# Patient Record
Sex: FEMALE | Race: WHITE | NOT HISPANIC OR LATINO | Employment: STUDENT | URBAN - METROPOLITAN AREA
[De-identification: names, ages, dates, MRNs, and addresses within clinical notes are randomized per-mention and may not be internally consistent; named-entity substitution may affect disease eponyms.]

---
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
## 2019-01-21 ENCOUNTER — OFFICE VISIT - HEALTHEAST (OUTPATIENT)
Dept: FAMILY MEDICINE | Facility: CLINIC | Age: 19
End: 2019-01-21

## 2019-01-21 DIAGNOSIS — R00.2 PALPITATIONS: ICD-10-CM

## 2019-01-21 DIAGNOSIS — R53.83 FATIGUE, UNSPECIFIED TYPE: ICD-10-CM

## 2019-01-21 LAB
ANION GAP SERPL CALCULATED.3IONS-SCNC: 10 MMOL/L (ref 5–18)
BUN SERPL-MCNC: 10 MG/DL (ref 8–22)
CALCIUM SERPL-MCNC: 9.4 MG/DL (ref 8.5–10.5)
CHLORIDE BLD-SCNC: 106 MMOL/L (ref 98–107)
CO2 SERPL-SCNC: 24 MMOL/L (ref 22–31)
CREAT SERPL-MCNC: 0.77 MG/DL (ref 0.6–1.1)
ERYTHROCYTE [DISTWIDTH] IN BLOOD BY AUTOMATED COUNT: 11.6 % (ref 11–14.5)
GFR SERPL CREATININE-BSD FRML MDRD: >60 ML/MIN/1.73M2
GLUCOSE BLD-MCNC: 82 MG/DL (ref 70–125)
HCT VFR BLD AUTO: 39.1 % (ref 35–47)
HGB BLD-MCNC: 12.9 G/DL (ref 12–16)
MCH RBC QN AUTO: 29.1 PG (ref 27–34)
MCHC RBC AUTO-ENTMCNC: 33.1 G/DL (ref 32–36)
MCV RBC AUTO: 88 FL (ref 80–100)
PLATELET # BLD AUTO: 155 THOU/UL (ref 140–440)
PMV BLD AUTO: 9.3 FL (ref 7–10)
POTASSIUM BLD-SCNC: 4 MMOL/L (ref 3.5–5)
RBC # BLD AUTO: 4.44 MILL/UL (ref 3.8–5.4)
SODIUM SERPL-SCNC: 140 MMOL/L (ref 136–145)
T3 SERPL-MCNC: 76 NG/DL (ref 45–175)
T4 FREE SERPL-MCNC: 1 NG/DL (ref 0.7–1.8)
TSH SERPL DL<=0.005 MIU/L-ACNC: 1.01 UIU/ML (ref 0.3–5)
WBC: 6.2 THOU/UL (ref 4–11)

## 2019-05-23 ENCOUNTER — OFFICE VISIT - HEALTHEAST (OUTPATIENT)
Dept: FAMILY MEDICINE | Facility: CLINIC | Age: 19
End: 2019-05-23

## 2019-05-23 DIAGNOSIS — F41.9 ANXIETY: ICD-10-CM

## 2019-05-23 DIAGNOSIS — Z11.1 SCREENING-PULMONARY TB: ICD-10-CM

## 2019-05-23 DIAGNOSIS — Z00.00 ROUTINE GENERAL MEDICAL EXAMINATION AT A HEALTH CARE FACILITY: ICD-10-CM

## 2019-05-23 RX ORDER — HYDROXYZINE HYDROCHLORIDE 10 MG/1
10 TABLET, FILM COATED ORAL 3 TIMES DAILY PRN
Qty: 30 TABLET | Refills: 0 | Status: SHIPPED | OUTPATIENT
Start: 2019-05-23 | End: 2021-08-30

## 2019-05-23 ASSESSMENT — MIFFLIN-ST. JEOR: SCORE: 1564.41

## 2019-05-26 LAB
GAMMA INTERFERON BACKGROUND BLD IA-ACNC: 0.04 IU/ML
M TB IFN-G BLD-IMP: NEGATIVE
MITOGEN IGNF BCKGRD COR BLD-ACNC: -0.02 IU/ML
MITOGEN IGNF BCKGRD COR BLD-ACNC: 0.01 IU/ML
QTF INTERPRETATION: NORMAL
QTF MITOGEN - NIL: 8 IU/ML

## 2019-11-14 ENCOUNTER — COMMUNICATION - HEALTHEAST (OUTPATIENT)
Dept: SCHEDULING | Facility: CLINIC | Age: 19
End: 2019-11-14

## 2020-10-15 ENCOUNTER — OFFICE VISIT - RIVER FALLS (OUTPATIENT)
Dept: FAMILY MEDICINE | Facility: CLINIC | Age: 20
End: 2020-10-15

## 2020-10-15 ENCOUNTER — AMBULATORY - RIVER FALLS (OUTPATIENT)
Dept: FAMILY MEDICINE | Facility: CLINIC | Age: 20
End: 2020-10-15

## 2020-10-18 LAB — SARS-COV-2 RNA SPEC QL NAA+PROBE: NOT DETECTED

## 2021-05-04 ENCOUNTER — AMBULATORY - RIVER FALLS (OUTPATIENT)
Dept: FAMILY MEDICINE | Facility: CLINIC | Age: 21
End: 2021-05-04

## 2021-05-04 ENCOUNTER — OFFICE VISIT - RIVER FALLS (OUTPATIENT)
Dept: FAMILY MEDICINE | Facility: CLINIC | Age: 21
End: 2021-05-04

## 2021-05-05 LAB — SARS-COV-2 RNA RESP QL NAA+PROBE: NEGATIVE

## 2021-05-24 ENCOUNTER — RECORDS - HEALTHEAST (OUTPATIENT)
Dept: ADMINISTRATIVE | Facility: CLINIC | Age: 21
End: 2021-05-24

## 2021-05-29 NOTE — PROGRESS NOTES
Assessment and Plan:    1. Routine general medical examination at a health care facility  She is up-to-date on vaccinations.    2. Screening-pulmonary TB  Will notify patient of TB screening results.  Completed paperwork for CNA classes.  - QTF-Mycobacterium tuberculosis by [facility]    3. Anxiety  Discussed treatment options.  She declines counseling.  Will start hydroxyzine use as needed. Educated on its indications and side effects.  She is to avoid taking this with other sedatives.  She is to follow-up if symptoms persist or worsen.  - hydrOXYzine HCl (ATARAX) 10 MG tablet; Take 1 tablet (10 mg total) by mouth 3 (three) times a day as needed for anxiety.  Dispense: 30 tablet; Refill: 0      Subjective:     Priya is a 19 y.o. female presenting to the clinic for a female physical.     LMP: this week, regular once/month  Hx of abnormal pap smear: n/a  Last pap smear: n/a  Perform self-breast exams: occasionally   Vaginal discharge or irritation: none   Sexually active: single, not sexually active   Contraception: none   Concerns for STDs: none   Previous pregnancies:none     She is starting classes to become a CNA.  She requests TB screening.  She denies chronic cough or unintentional weight loss.  She denies history of positive TB screening in the past.    Patient is concerned of anxiety since middle school.  She has had difficulty coping in social situations.  When she has to meet new people, she develops chest pain, tremors, sweaty palms, tachycardia.  Symptoms occurred during an interview last week.  She becomes concerned of how she looks and presents herself.  She is interested in medication for this today.    Review of systems:  I performed a 10 point review of systems.  All pertinent positives and negatives are noted in the HPI. All others are negative.     No Known Allergies    Current Outpatient Medications on File Prior to Visit   Medication Sig Dispense Refill     ferrous sulfate 325 (07  FE) MG tablet Take 1 tablet by mouth daily with breakfast.       No current facility-administered medications on file prior to visit.        Social History     Socioeconomic History     Marital status: Single     Spouse name: Not on file     Number of children: Not on file     Years of education: Not on file     Highest education level: Not on file   Occupational History     Not on file   Social Needs     Financial resource strain: Not on file     Food insecurity:     Worry: Not on file     Inability: Not on file     Transportation needs:     Medical: Not on file     Non-medical: Not on file   Tobacco Use     Smoking status: Never Smoker     Smokeless tobacco: Never Used   Substance and Sexual Activity     Alcohol use: No     Drug use: No     Sexual activity: Never   Lifestyle     Physical activity:     Days per week: Not on file     Minutes per session: Not on file     Stress: Not on file   Relationships     Social connections:     Talks on phone: Not on file     Gets together: Not on file     Attends [employer] service: Not on file     Active member of club or organization: Not on file     Attends meetings of clubs or organizations: Not on file     Relationship status: Not on file     Intimate partner violence:     Fear of current or ex partner: Not on file     Emotionally abused: Not on file     Physically abused: Not on file     Forced sexual activity: Not on file   Other Topics Concern     Not on file   Social History Narrative    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  Doing well in school.  She also has a Mac[employer] minor and plans to study abroad in [place] for Spring 2019 semester.     Non-smoker, no alcohol use.  Not sexually active.        Joyce Lopez MD       No past medical history on file.    Family History   Problem Relation Age of Onset     Thyroid disease Mother         hyper--> Iodine--> now hypo     Fibroids Mother         caused anemia     Anemia Father        No  past surgical history on file.    Objective:     Vitals:    05/23/19 0856   BP: 108/60   Pulse: 73   SpO2: 99%       Patient is alert, no obvious distress.   Skin: Warm, dry.  No rashes or lesions. Skin turgor rapid return.   HEENT:  Eyes normal.  Ears normal.  Nose patent, mucosa pink.  Oropharynx mucosa pink, no lesions or tonsil enlargement.   Neck:  Supple, without lymphadenopathy, bruits, JVD. Thyroid normal texture and size.    Lungs:  Clear to auscultation.  No wheezing, rales noted.  Respirations even and unlabored.   Heart:  Regular rate and rhythm.  No murmurs.   Breasts:  Deferred per patient  Abdomen: Soft, nontender.  No organomegaly.  Bowel sounds normoactive.  No guarding or masses noted.   : deferred  Musculoskeletal:  Full ROM of extremities.  Muscle strength equal +5/5.   Neurological:  Cranial nerves 2-12 intact.

## 2021-05-30 VITALS — HEIGHT: 70 IN | BODY MASS INDEX: 20.92 KG/M2 | WEIGHT: 146.1 LBS

## 2021-05-30 VITALS — BODY MASS INDEX: 19.55 KG/M2 | WEIGHT: 140.19 LBS

## 2021-05-30 VITALS — BODY MASS INDEX: 20.62 KG/M2 | WEIGHT: 144 LBS | HEIGHT: 70 IN

## 2021-05-31 VITALS — BODY MASS INDEX: 20.92 KG/M2 | WEIGHT: 150 LBS

## 2021-05-31 VITALS — BODY MASS INDEX: 21.09 KG/M2 | WEIGHT: 147.3 LBS | HEIGHT: 70 IN

## 2021-06-01 VITALS — BODY MASS INDEX: 21.15 KG/M2 | WEIGHT: 147.7 LBS | HEIGHT: 70 IN

## 2021-06-01 VITALS — BODY MASS INDEX: 20.5 KG/M2 | WEIGHT: 148 LBS

## 2021-06-01 VITALS — BODY MASS INDEX: 21.08 KG/M2 | WEIGHT: 152.2 LBS

## 2021-06-02 VITALS — HEIGHT: 72 IN | WEIGHT: 154.4 LBS | BODY MASS INDEX: 20.91 KG/M2

## 2021-06-02 VITALS — WEIGHT: 152.6 LBS | BODY MASS INDEX: 21.13 KG/M2

## 2021-06-03 NOTE — TELEPHONE ENCOUNTER
"Pt has vomited 12x over the past two hours.  Also reports:  - Sore throat.  - Runny nose.  - \"Chills but no fever.\"  - Body aches.    \"Last emesis included 'a few blood specks'.\"  All emesis episodes \"look bright-yellow-to-green.\"  Pt denies typical ny-brown stomach-acid color to emesis.  Some continuous abdominal pain \"centrally located.\"    Advised ED eval.  Pt agrees -> will have someone accompany her.    Bertha Sutton RN BSBA  [facility] RN Triage     Reason for Disposition    Bile (green color) in the vomit (Exception: stomach juice which is yellow)    Blood (red or coffee-ground color) in the vomit that's not from a nosebleed    Protocols used: VOMITING WITHOUT DIARRHEA-P-OH      "

## 2021-06-08 NOTE — PROGRESS NOTES
Assessment  1. Acute sinusitis     2. Iron deficiency anemia  HM2(CBC w/o Differential)   3. Neoplasm of uncertain behavior of skin         Plan    1.  Acute sinusitis: Will treat with Augmentin twice daily for 10 days.  Counseled on use of medication and side effects.  Also suggested fluticasone nasal spray.  May continue ibuprofen, cough drops and DayQuil as needed for symptom relief.  Encouraged rest and fluids.  Follow-up if symptoms worsening or not improving with these measures.  2.  Iron deficiency anemia: Recheck hemogram today.  Continue increased dietary intake of iron.  3.  Neoplasm of uncertain behavior: Advised that they schedule follow-up appointment with dermatologist to have these moles checked.  4.  Health maintenance: Menactra #2 administered today.  They decline HPV vaccine.  They also declined influenza vaccine.      Subjective  Priya Mann is a 16 y.o. female who comes into clinic accompanied by her mother due to concerns about ongoing cough.  She had recently traveled to [place].  Developed a cough about 2 weeks ago.  Cough does not seem to be resolving.  Mother has similar cough and recently saw primary care provider and was placed on antibiotics.  Patient complains of slight sore throat.  She has had some rhinorrhea as well as some wheezing.  Feels pressure in her ears.  Cough is productive of some phlegm.  She has had yellow drainage from the nose.  Has not had fevers or chills.  Has had some sinus pain and pressure.  Had a headache yesterday.  States that she cannot breathe through her nose.  She has been taking NyQuil and DayQuil along with ibuprofen and cough drops.  They also have concern about a couple of moles.  She noticed a mole near her right shoulder blade while in [place].  States that this has some irregular borders and irregular coloration to it.  She has history of atypical moles and had been removed in the past and she was told to watch for those things in association with  most.  She also had a mole removed on her left thigh several months ago.  This has returned and they would like me to take a look at it as well.  We reviewed medications and allergies.  They would also like to recheck her hemoglobin today.  She was found to have iron deficiency anemia by her primary care provider several months ago.  She has been taking an iron supplement.  Review of systems otherwise negative.  No other concerns or questions today.    Current Outpatient Prescriptions   Medication Sig Dispense Refill     amoxicillin-clavulanate (AUGMENTIN) 875-125 mg per tablet Take 1 tablet by mouth 2 (two) times a day for 7 days. 14 tablet 0     benzonatate (TESSALON PERLES) 100 MG capsule Take 1 capsule (100 mg total) by mouth 3 (three) times a day as needed for cough. 30 capsule 0     ferrous sulfate 325 (65 FE) MG tablet Take 1 tablet (325 mg total) by mouth daily. 30 tablet 3     SIMETHICONE (GAS-X ORAL) Take by mouth.       No current facility-administered medications for this visit.          Objective   Visit Vitals     /64 (Patient Site: Left Arm, Patient Position: Sitting, Cuff Size: Adult Regular)     Pulse 67     Temp 98  F (36.7  C) (Tympanic)     Wt 140 lb 3 oz (63.6 kg)     LMP 01/16/2017 (Exact Date)     SpO2 98%     Breastfeeding No         Gen: alert, no acute distress  HEENT;  NCAT, TMs normal, nose clear, OP clear  Neck: supple, no lymphadenopathy, thyroid normal  CV: RRR, no murmur  Resp: CTAB, no wheeze/crackles  Ext: warm, dry, no edema  Skin: Oval-shaped hyperpigmented lesion present right scapula with color variegation and slightly irregular borders.  Small hyper pigmented nevus present medial left thigh

## 2021-06-09 NOTE — PROGRESS NOTES
Assessment:     Priya was seen today for mass and labs only.    Diagnoses and all orders for this visit:    Anemia  -     HM1(CBC and Differential)  -     HM1 (CBC with Diff)    Posterior cervical lymphadenopathy  -     HM1(CBC and Differential)  -     HM1 (CBC with Diff)          Plan:     1. Anemia  Advise she continue to take iron.  Will do a CBC with differential today.  Give her option of oral birth control pills to help could not control her menses/menorrhagia  She declines OBC use    - HM1(CBC and Differential)  - HM1 (CBC with Diff)    2. Posterior cervical lymphadenopathy  Posterior cervical lymph node is mildly enlarged and likely irritated by her braces that she is wearing a small sebaceous cysts in the back of her tonsillar crypt.  Reassurance given  - HM1(CBC and Differential)  - HM1 (CBC with Diff)        Subjective:      Monie is a 16 y.o. female presenting to my clinic for evaluation of anemia and evaluation of mild anterior lymphadenopathy in the back of her left neck.  She has had that left neck lymph node that is been mildly enlarged for about a year.  It is mobile has not grown.  She has had braces placed within this year and does get tonsillar crypt inflammatory changes from time to time.  She has had anemia over the past several years also.  When she stopped taking her regular daily iron she started getting weak and dizzy again.  So now she is taking iron and her hemoglobin comes back greater than 12.  Patient is 16 and is having heavy periods.  She has about 5 days of bleeding with 3 days of heavy.  I think this is the reason for her low hemoglobin i.e. menorrhagia.    It is discussed with her that treatment for menorrhagia can be oral birth control pills just so she is given that option.  She is not inclined to want that today.  Patient is here with her mother      Current Outpatient Prescriptions on File Prior to Visit   Medication Sig Dispense Refill     ferrous sulfate 325 (65 FE)  "MG tablet Take 1 tablet (325 mg total) by mouth daily. 30 tablet 3     SIMETHICONE (GAS-X ORAL) Take by mouth.       benzonatate (TESSALON PERLES) 100 MG capsule Take 1 capsule (100 mg total) by mouth 3 (three) times a day as needed for cough. 30 capsule 0     No current facility-administered medications on file prior to visit.      No Known Allergies  History reviewed. No pertinent past medical history.  History reviewed. No pertinent surgical history.  Social History     Social History     Marital status: Single     Spouse name: N/A     Number of children: N/A     Years of education: N/A     Occupational History     Not on file.     Social History Main Topics     Smoking status: Never Smoker     Smokeless tobacco: Never Used     Alcohol use No     Drug use: No     Sexual activity: Not on file     Other Topics Concern     Not on file     Social History Narrative     History reviewed. No pertinent family history.    ROS:  I have performed a 10 point ROS.  All pertinent positives and negatives are found in the HPI.  All others are negative.      Objective:     Physical Exam:  /64 (Patient Site: Right Arm, Patient Position: Sitting, Cuff Size: Adult Regular)  Pulse 64  Temp 98.6  F (37  C) (Oral)   Ht 5' 11\" (1.803 m)  Wt 146 lb 1.6 oz (66.3 kg)  BMI 20.38 kg/m2  General Appearance: Alert, cooperative, no distress, appears stated age    Head: Normocephalic, without obvious abnormality, atraumatic/  Mild facial acne on forehead.  Eyes: PERRL, conjunctiva/corneas clear, EOM's intact  Ears: Normal TM's and external ear canals, both ears  Nose: Nares normal, septum midline,mucosa normal, no drainage  Throat: Lips, mucosa, and tongue normal; teeth and gums normal/posterior tonsillar crypt with sebaceous material and mild irritation  The braces are noted with some gum inflammation on her right upper teeth  Neck: Supple, symmetrical, trachea midline, mild posterior cervical lymph node palpated on the right;  " thyroid: not enlarged, symmetric, no tenderness/mass/nodules; no carotid bruit or JVD  Lungs: Clear to auscultation bilaterally, respirations unlabored  Heart: Regular rate and rhythm, S1 and S2 normal, no murmur, rub, or gallop,     Skin: Skin color, texture, turgor normal, no rashes or lesions  Lymph nodes: Posterior cervical palpable lymph node on the right.  Mobile, not fixated, 0.5 x 1 cm  Neurologic: Normal   Mental status: Stable    Labs: CBC report reviewed today with a hemoglobin above 12.  Minor deviation in neutrophils and lymphocytes within normal limits.  Letter sent to patient

## 2021-06-10 NOTE — PROGRESS NOTES
Subjective:      Priya Mann is a 17 y.o. female here with mom for evaluation of sore throat x 1 day. Some pain with swallowing, not significant change with appetite or fluid intake, no N/V/D, no stomach ache. Does have accompanying runny nose, congestion, denies any cough. Had a fever of 100.8 (oral) yesterday, this morning was 99.5, took some Ibuprofen, seemed to help, patient afebrile in clinic. Some generalized body aches and feeling more fatigued. Mom diagnosed with strep last Monday. She has finals coming up this next week, mom has asked that she be tested for influenza.    Objective:     Vitals:    05/21/17 1213   BP: 120/80   Pulse: 107   Resp: 18   Temp: 98.9  F (37.2  C)   SpO2: 98%       General: Alert, interactive, NAD, cooperative on exam  Eyes: PERRLA, EOMI, conjunctivae clear.   Ears: Right TM; pink and translucent. Left TM; pink and translucent   Nose:  Nasal mucosa erythema and mild inflammation> Clear mucus. No maxillary or frontal sinus tenderness  Mouth/Throat:  Tonsillar hypertrophy, 1+, erythematous, no exudate. Uvula midline. Posterior pharynx erythematous with some postnasal drainage.  Mucus membranes pink and moist, free of lesions.  Neck: Supple, symmetrical, trachea midline, no adenopathy   Lungs: CTA bilaterally, good air movement throughout. No rales, rhonchi or wheezing  Heart:: Regular rate and rhythm, S1, S2 normal, no murmur, click, rub or gallop      Results for orders placed or performed in visit on 05/21/17   Rapid Strep A Screen-Throat   Result Value Ref Range    Rapid Strep A Antigen No Group A Strep detected, presumptive negative No Group A Strep detected, presumptive negative   Influenza A/B Rapid Test   Result Value Ref Range    Influenza  A, Rapid Antigen No Influenza A antigen detected No Influenza A antigen detected    Influenza B, Rapid Antigen No Influenza B antigen detected No Influenza B antigen detected            Assessment/Plan:      1. Viral URI    2. Fever  -  Influenza A/B Rapid Test    3. Sore throat  - Rapid Strep A Screen-Throat  - Group A Strep, RNA Direct Detection, Throat     I reviewed exam and lab findings with patient and mom. Will contact parents/patient in next 48 hours only if strep confirmatory test positive, would prescribe antibiotic at that time. Dicussed contagious precautions just in case. If strep negative, likely viral illness that will resolve spontaneously. Recommended supportive cares; nasal saline, elevating hob, humidified air. Advised plenty of fluids and rest. Tylenol or Ibuprofen prn for fever/discomfort. If symptoms not improved in next 5-7 days, f/u with PCP, sooner if worsening.      -Patient instructions given.

## 2021-06-14 NOTE — PROGRESS NOTES
Assessment:      Left foot pain     Plan:      1. Rest, ice, elevate  2. NSAIDs  3. Discussed signs of worsening symptoms and when to follow-up in 4 days if no symptom improvement.  4. Provided reassurance, no imaging needed at this time     Patient Instructions   You were seen today for left foot pain.     Symptom management:  - Try to rest the foot as tolerated  - Elevate the foot above the chest  - Ice/heat pads  - May use tylenol/ibuprofen for swelling and discomfort    Reasons to be re-evaluated:  - Swelling and pain worsen  - Fever of 100.4  - No symptom improvement in 4 days              Subjective:       Priya Mann is a 17 y.o. female here for evaluation of left foot pain. Patient caught her left foot in a door stop which caught her 3rd, 4th, and 5th and pulled laterally. Onset was 3 days ago. Noted redness and warmth later that night, but able to bear weight. Notices pain after long periods of walking or going up and down stairs. Denies swelling or bruising. Pain is rated 6/10 during movement of affected toes. Symptoms have been gradually improving. No history of previous foot injury. Denies foot weakness or numbness/tingling.    The following portions of the patient's history were reviewed and updated as appropriate: allergies, current medications and problem list.    Review of Systems  Pertinent items are noted in HPI.     Allergies  No Known Allergies     Objective:      /66 (Patient Site: Right Arm, Patient Position: Sitting, Cuff Size: Adult Regular)  Pulse 78  Temp 98.3  F (36.8  C) (Oral)   Resp 16  Wt 150 lb (68 kg)  SpO2 99%  Breastfeeding? No  BMI 20.92 kg/m2  General appearance: alert, appears stated age, cooperative and no distress  Extremities: left foot: base of 4th toe mildly tender to palpation, FROM with mild pain on toe flexion; strength intact, sensation intact  Skin: no erythema, swelling, or ecchymosis

## 2021-06-15 NOTE — PROGRESS NOTES
Assessment and Plan:     1. Diarrhea, unspecified type  2.  Iron deficiency anemia  I suspect patient's diarrhea may be due to her new coffee intake.  Recommend discontinuing this to see how she feels over the next few weeks.  If symptoms persist, will refer to gastroenterology to rule out food intolerance and colitis.  Will rule out celiac and H. pylori.  Discussed increasing fluid and fiber in diet.  We discussed discontinuing her iron supplement.  We will consider a trial without the iron supplement for 2-3 months.  She will take a daily multivitamin.  She will follow-up for hemogram recheck.  She is content with the plan.   - Celiac(Gluten)Antibody Panel ($$$)  - H. pylori Antigen, Stool    Subjective:     Priya is a 17 y.o. female presenting to the clinic for concerns for diarrhea for 3 months.  Patient has a bowel movement once daily.  She denies any blood or mucus in the stool.  She occasionally has slight cramping prior to the bowel movement.  Patient states she started drinking coffee when she went to college.  It does correlate with when she developed diarrhea.  The diarrhea typically occur shortly after consuming coffee.  She denies any recent travel.  No fever has been present.  She denies any recent antibiotic use.  She stopped drinking coffee for the last few days and her symptoms have improved.  She has history of iron deficiency anemia.  She is taking an iron supplement daily.  Last hemoglobin was 12.6 on 4/3/17.  She denies having heavy menses.  She believes the iron deficiency was due to inadequate iron intake in the past.  She would like to know if she could stop her iron supplement.    Review of Systems: A complete 14 point review of systems was obtained and is negative or as stated in the history of present illness.    Social History     Social History     Marital status: Single     Spouse name: N/A     Number of children: N/A     Years of education: N/A     Occupational History     Not on file.  "    Social History Main Topics     Smoking status: Never Smoker     Smokeless tobacco: Never Used     Alcohol use No     Drug use: No     Sexual activity: Not on file     Other Topics Concern     Not on file     Social History Narrative       Active Ambulatory Problems     Diagnosis Date Noted     Upper Respiratory Infection      Benign Skin Neoplasm      Resolved Ambulatory Problems     Diagnosis Date Noted     Acute Suppurative Otitis Media      Acute Sore Throat      No Additional Past Medical History       No family history on file.    Objective:     /64  Pulse 70  Ht 5' 11.25\" (1.81 m)  Wt 147 lb 4.8 oz (66.8 kg)  BMI 20.4 kg/m2    Patient is alert, in no obvious distress.   Skin: Warm, dry.    Lungs:  Clear to auscultation. Respirations even and unlabored.  No wheezing or rales noted.   Heart:  Regular rate and rhythm.  No murmurs  Abdomen: Soft, nontender.  No organomegaly. Bowel sounds normoactive. No guarding or masses noted.               "

## 2021-06-17 NOTE — PROGRESS NOTES
[facility] Walk-[facility] Visit [place]    Subjective:    Priya Mann is a 18 y.o. female who presents for evaluation of upper respiratory symptoms x 4 days. She describes rhinorrhea, nasal congestion and sore throat. She has not had any cough. She feels a lot of drainage down the back of throat. She has also noticed occasional audible wheezing, but denies any shortness of breath and has no history of asthma. She says she has gotten this in the past with URI's. She has had no fevers or n/v/d. Her mom had similar symptoms recently. At home she has tried nyquil and cough drops, these have helped a little bit. She has no exposure to strep throat. She has not tried any nasal decongestants or nasal steroids. She has no chronic medical conditions and takes no daily meds. She has no allergies to any medications. She is going back to college tomorrow, so her mom just wanted to make sure she didn't have strep throat or another infection that would need an antibiotic.     ROS:   General: Denies fevers, chills.  Skin: Denies new rashes or lesions.  HEENT: +Sore throat, rhinorrhea.   CV: Denies chest pain, palpitations or shortness of breath.  GI: Denies N/V/D.    Objective:    /70  Pulse 91  Temp 98.1  F (36.7  C)  Resp 18  Wt 148 lb (67.1 kg)  SpO2 98%  BMI 20.5 kg/m2    Physical Exam:  General: NAD.  Skin: No rashes or lesions visualized.  HEENT: PERRLA, EOMI. TM's clear bilaterally. Posterior pharynx with only mild erythema. No significant nasal drainage.   Heart: Regular rhythm, no murmurs.  Lungs: Clear to auscultation b/l, no wheezes, crackles, or rales.    Assessment/Plan:    Pharyngitis: Rapid strep negative, will await culture results. I would be surprised if this is strep, so will not treat empirically. I suspect that this is viral or from post-nasal drip. Will treat with flonase and asked that she try tylenol or ibuprofen as needed. If symptoms are not improving in 3-5 days, I have asked that she follow up with  primary care physician.    Wheezing: Clear on exam, sating well. Not sure what to make of this report, but if anything this is probably just some mild hyperreactivity from a viral URI. If experiencing any SOB, will have her return or evaluation.     Jessika Mcdonald, DO

## 2021-06-19 ENCOUNTER — HEALTH MAINTENANCE LETTER (OUTPATIENT)
Age: 21
End: 2021-06-19

## 2021-06-19 NOTE — PROGRESS NOTES
Subjective:      Patient ID: Priya Mann is a 18 y.o. female.    Chief Complaint:    HPI Priya Mann is a 18 y.o. female who presents today complaining of bilateral ear pain after ×3 weeks.  Patient states that she got her ears pierced last April and then 3 weeks ago she switched out her earrings to a pair of her mom's and she started having ear crusting and pain.  She is also had a rash behind the left ear for the past 3 days.  She has a mass on the right side of the neck that has been there for a year, it is unchanged and not painful to the touch.  She denies any fever.      Social History   Substance Use Topics     Smoking status: Never Smoker     Smokeless tobacco: Never Used     Alcohol use No       Review of Systems   Constitutional: Negative for fever.   Skin: Positive for rash.        Positive for earlobe pain and discharge around ear piercings   Hematological: Positive for adenopathy.       Objective:     BP 94/60 (Patient Site: Left Arm, Patient Position: Sitting, Cuff Size: Adult Regular)  Pulse 94  Temp 98.2  F (36.8  C) (Oral)   Resp 16  Wt 152 lb 3.2 oz (69 kg)  LMP 08/03/2018  SpO2 97%  BMI 21.08 kg/m2    Physical Exam   Constitutional: She appears well-developed and well-nourished. No distress.   HENT:   Head: Normocephalic and atraumatic.   Eyes: Conjunctivae are normal.   Neck:   There is one singular lymph node palpable on the right posterior chain.  It is very small approximately 8 mm in diameter.  It is mobile and nontender.  It is been unchanged.   Pulmonary/Chest: Effort normal. No respiratory distress.   Skin: She is not diaphoretic.   An erythematous bumpy rash present kind the left ear.  Is nontender to palpation and not warm to the touch.  Is consistent with a contact dermatitis.  There is a similar bumps surrounding the gym of the earrings that are present in both of her ears.  Surrounding the piercings is yellow crusting and discharge.  There is no significant erythema around  the piercings.   Psychiatric: She has a normal mood and affect. Her behavior is normal. Judgment and thought content normal.   Nursing note and vitals reviewed.      Clinical Decision Making:  Suspect localized piercing infection possibly secondary to contact dermatitis.  Patient is unsure what the earrings that she was wearing remain out of, but I suspect nickel as this is commonly used in jewelry.  I recommended removing all metal during treatment period.  She was treated with soap and Bactroban and gentamicin ointment.  Lymph node noted on left posterior chain is not abnormal in size or shape.  Patient was reassured that this is a normal physical exam finding.    Assessment:     Procedures    1. Complication of ear piercing  mupirocin (BACTROBAN) 2 % ointment    gentamicin (GARAMYCIN) 0.1 % ointment   2. Contact dermatitis           Patient Instructions   1. I suggest that piercings be removed during treatment of localized skin infection.   2. Begin washing with goal dial three times per day. After washing apply a 50:50 combination of Mupirocin and Gentamicin to the skin.   3. I suspect that the rash is a dermatitis either from the metal that the earring is made from or from the pus that is exuding from the ear. You can apply an over the counter Hydrocortisone to the affected area.  4.  This does not appear to be a deep tissue infection, so I do not thing oral antibiotic therapy is necessary.   5. Follow up if you are not having any improvement in 7-10 days. If you have resolution of your symptoms and your piercing has not closed up you may replace the earring with surgical steel.

## 2021-06-20 NOTE — PROGRESS NOTES
Assessment and Plan:    1. Routine general medical examination at a health care facility  Discussed consuming a healthy diet and exercising.  Discussed importance routine sunscreen.  She sees a dermatologist once a year.  She declines Gardasil and influenza vaccines.  She states she is otherwise up-to-date.  She is not interested in birth control or STD screening today.  She will follow-up as needed.      Subjective:     Priya is a 18 y.o. female presenting to the clinic for a female physical.     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  She requires a physical form to be completed.     Review of systems:  I performed a 10 point review of systems.  All pertinent positives and negatives are noted in the HPI. All others are negative.     No Known Allergies    Current Outpatient Prescriptions on File Prior to Visit   Medication Sig Dispense Refill     fluticasone (FLONASE) 50 mcg/actuation nasal spray Two sprays in 2 each nostril daily 16 g 1     gentamicin (GARAMYCIN) 0.1 % ointment Apply topically 3 (three) times a day. 15 g 0     mupirocin (BACTROBAN) 2 % ointment Apply topically 3 (three) times a day. 22 g 0     No current facility-administered medications on file prior to visit.        Social History     Social History     Marital status: Single     Spouse name: N/A     Number of children: N/A     Years of education: N/A     Occupational History     Not on file.     Social History Main Topics     Smoking status: Never Smoker     Smokeless tobacco: Never Used     Alcohol use No     Drug use: No     Sexual activity: Not on file     Other Topics Concern     Not on file     Social History Narrative       No past medical  history on file.    No family history on file.    No past surgical history on file.    Objective:     Vitals:    08/30/18 1051   BP: 104/64   Pulse: 68       Patient is alert, no obvious distress.   Skin: Warm, dry.  No rashes or lesions. Skin turgor rapid return.   HEENT:  Eyes normal.  Ears normal.  Nose patent, mucosa pink.  Oropharynx mucosa pink, no lesions or tonsil enlargement.   Neck:  Supple, without lymphadenopathy, bruits, JVD. Thyroid normal texture and size.    Lungs:  Clear to auscultation.  No wheezing, rales noted.  Respirations even and unlabored.   Heart:  Regular rate and rhythm.  No murmurs.   Breasts:  Normal.  No surrounding adenopathy.   Abdomen: Soft, nontender.  No organomegaly.  Bowel sounds normoactive.  No guarding or masses noted.   :  deferred  Musculoskeletal:  Full ROM of extremities.  Muscle strength equal +5/5.   Neurological:  Cranial nerves 2-12 intact.

## 2021-06-23 NOTE — PROGRESS NOTES
Assessment/plan   Priya Mann is a 18 y.o. female who is new to my practice.    Priya was seen today for anemia and palpitations.    Diagnoses and all orders for this visit:    Fatigue, with h/o iron deficiency anemia.   Palpitations   She did have mild orthostatic hypotension noted today on exam. No cardiac history and palpitations are quite infrequent. Ddx includes PVCs or other arrythrymia though cardiac exam normal today.  No significant anxiety.  Agree with decision to recheck her blood counts today and additionally evaluate her thyroid.  Given she is clinically symptomatic with fatigue and intermittent palpitations would be reasonable to screen with TSH, including T4 and T3.  We will check electrolytes to evaluate further.   Declined EKG today which I feel is reasonable at this point given hx/exam.  We discussed eventual plans for EKG and or month-long Holter monitor given the infrequency of her symptoms should she desire further workup of the palpitations.    -     HM2(CBC w/o Differential)  -     Thyroid Stimulating Hormone (TSH)  -     T4, Free  -     T3, Total  -     Basic Metabolic Panel   -At this time she will continue once daily dosing of her iron, pending results may change dose.    She declined HPV vaccination, chlamydia testing, and influenza vaccine today.    Follow up: 1-2 weeks if fatigue is not improving and labs returned normal if she would like to discuss this further otherwise for annual physical      I spent 40 minutes with the patient, >50% of which was in counseling regarding the patient's medical issues as noted above.    Joyce Lopez MD    Subjective:      HPI: Priya Mann is a 18 y.o. female who is here for:    Chief Complaint   Patient presents with     Anemia     has been tired recently, x 3-4 weeks     Palpitations     going on for about a couple years, in the last 6 months has increased       Concern for anemia:  Was dx with anemia senior of high school- taking iron pills  since that time. If she mises a dose for a few days she feels more tired.   Past 3-4 weeks feeling drowsy, more tired than normal.  No specific change to her sleep pattern so she is wondering if maybe she is more anemic than previously.  Has been taking her iron supplement once daily.  Describes sometimes feeling lightheaded with changing from seated to standing position.     Both parents had anemia- mother with vaginal bleeding from uterine fibroids, father unsure.   Mother also h/o overactive thyroid s/p radioactive iodine, now on Synthroid.   Patient denies weight change, good appetite. No constipation or diarrhea. No hair, skin or nail changes.    Regular menses.  Blood flow is about 5 days, 2 of which with heavier flow changing pads every 2-3 hours.  She describes overall she feels her periods are within normal and do not feel heavy by any means.  Denies any abdominal cramping.    Palpitations: since senior year of highschool. HR seemed to slow down or have an irregular beat as she was falling asleep. Happens every 2- 3 months. No chest pain.    Was active with[employer] riding- no exertional sx with [employer] work outs.  Leaving for [place] for the semester soon.   Not using much caffeine at all.   No syncope, no family history of early cardiac disease.    Review of Systems:  She is not sexually active.  No concerns for STDs.  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  Doing well in school.  She also has a Croatian minor and plans to study abroad in [place] for Spring 2019 semester.     Non-smoker, no alcohol use.  Not sexually active.        Joyce Lopez MD       Medical History:  Patient Active Problem List   Diagnosis     Benign Skin Neoplasm     No past medical history on file.  No past surgical history on file.  Patient has no known  allergies.  Family History   Problem Relation Age of Onset     Thyroid disease Mother         hyper--> Iodine--> now hypo     Fibroids Mother         caused anemia     Anemia Father        Medications:  Current Outpatient Medications   Medication Sig Dispense Refill     ferrous sulfate 325 (65 FE) MG tablet Take 1 tablet by mouth daily with breakfast.       No current facility-administered medications for this visit.        Imaging & Labs reviewed this visit: Hemoglobin 10.8 October 2016 with MCV of 77, previous to that normal hemoglobin and MCV in 2009.  Most recent hemoglobin of 12.6 with normal MCV 4/3/2017.    Last TSH normal 1.35 October 2016.      Objective:      Vitals:    01/21/19 1458   BP: 102/62   Pulse: 64   Weight: 152 lb 9.6 oz (69.2 kg)       Physical Exam:     General: Alert, no acute distress.   HEENT: normocephalic conjunctivae are clear, Normal pearly TMs bilaterally without erythema, pus or fluid. Position and landmarks are normal.  Nose is clear.  Oropharynx is moist and clear, without tonsillar hypertrophy, asymmetry, exudate or lesions.   Neck: supple without adenopathy or thyromegaly.  Lungs: Good aeration bilaterally. Clear to auscultation without wheezes, rales or rhonci.   Heart: regular rate and rhythm, normal S1 and S2, no murmurs  Abdomen: soft and nontender  Skin: fair skin tone vs pale (her mother seems more tan comparatively)   Neuro: alert, interactive moving all extremities equally, normal muscle tone in all 4 extremities, deep tendon reflexes 2+ symmetrically at the patella      Joyce Lopez MD

## 2021-08-30 ENCOUNTER — OFFICE VISIT (OUTPATIENT)
Dept: FAMILY MEDICINE | Facility: CLINIC | Age: 21
End: 2021-08-30
Payer: COMMERCIAL

## 2021-08-30 VITALS
BODY MASS INDEX: 21.4 KG/M2 | WEIGHT: 158 LBS | HEART RATE: 70 BPM | SYSTOLIC BLOOD PRESSURE: 102 MMHG | DIASTOLIC BLOOD PRESSURE: 66 MMHG | HEIGHT: 72 IN | OXYGEN SATURATION: 98 %

## 2021-08-30 DIAGNOSIS — Z13.1 DIABETES MELLITUS SCREENING: ICD-10-CM

## 2021-08-30 DIAGNOSIS — Z13.220 LIPID SCREENING: ICD-10-CM

## 2021-08-30 DIAGNOSIS — Z00.00 ENCOUNTER FOR ROUTINE HISTORY AND PHYSICAL EXAM IN FEMALE: Primary | ICD-10-CM

## 2021-08-30 LAB
CHOLEST SERPL-MCNC: 128 MG/DL
FASTING STATUS PATIENT QL REPORTED: YES
FASTING STATUS PATIENT QL REPORTED: YES
GLUCOSE BLD-MCNC: 95 MG/DL (ref 70–125)
HDLC SERPL-MCNC: 51 MG/DL
HGB BLD-MCNC: 11.5 G/DL (ref 11.7–15.7)
LDLC SERPL CALC-MCNC: 69 MG/DL
TRIGL SERPL-MCNC: 39 MG/DL

## 2021-08-30 PROCEDURE — 85018 HEMOGLOBIN: CPT | Performed by: NURSE PRACTITIONER

## 2021-08-30 PROCEDURE — 36415 COLL VENOUS BLD VENIPUNCTURE: CPT | Performed by: NURSE PRACTITIONER

## 2021-08-30 PROCEDURE — 90714 TD VACC NO PRESV 7 YRS+ IM: CPT | Performed by: NURSE PRACTITIONER

## 2021-08-30 PROCEDURE — 82947 ASSAY GLUCOSE BLOOD QUANT: CPT | Performed by: NURSE PRACTITIONER

## 2021-08-30 PROCEDURE — 99395 PREV VISIT EST AGE 18-39: CPT | Mod: 25 | Performed by: NURSE PRACTITIONER

## 2021-08-30 PROCEDURE — 80061 LIPID PANEL: CPT | Performed by: NURSE PRACTITIONER

## 2021-08-30 PROCEDURE — 90471 IMMUNIZATION ADMIN: CPT | Performed by: NURSE PRACTITIONER

## 2021-08-30 ASSESSMENT — MIFFLIN-ST. JEOR: SCORE: 1580.74

## 2021-08-30 NOTE — PROGRESS NOTES
Assessment and Plan:    Encounter for routine history and physical exam in female  Recommend consuming a healthy diet and exercising.  She declines Gardasil and Covid vaccines.  Updated TD vaccine today.  She declines a Pap smear as she has never been sexually active.  She is not interested in any birth control or STD screening today.  She is content with the plan.  - Hemoglobin  - TD PRSERV FREE >=7 YRS ADS IM  - Hemoglobin    Diabetes mellitus screening  - Glucose  - Glucose    Lipid screening  - Lipid panel reflex to direct LDL Fasting  - Lipid panel reflex to direct LDL Fasting      Subjective:     Priya is a 21 year old female presenting to the clinic for a female physical.     LMP: 2 weeks ago, regular once/month   Hx of abnormal pap smear: n/a  Last pap smear: n/a  Perform self-breast exams: none   Vaginal discharge or irritation: none   Sexually active: single, not sexually active   Contraception: none   Concerns for STDs: none   Previous pregnancies:none     Answers for HPI/ROS submitted by the patient on 8/30/2021  Frequency of exercise:: 1 day/week  Getting at least 3 servings of Calcium per day:: Yes  Diet:: Regular (no restrictions)  Taking medications regularly:: Yes  Medication side effects:: None  Bi-annual eye exam:: NO  Dental care twice a year:: NO  Sleep apnea or symptoms of sleep apnea:: None  Additional concerns today:: Yes  Duration of exercise:: Less than 15 minutes    She feels well and has no other concerns today.     Review of systems:  I performed a 10 point review of systems.  All pertinent positives and negatives are noted in the HPI. All others are negative.     No Known Allergies    Current Outpatient Medications   Medication     ferrous sulfate 325 (65 FE) MG tablet     hydrOXYzine HCl (ATARAX) 10 MG tablet     ondansetron (ZOFRAN-ODT) 4 MG disintegrating tablet     No current facility-administered medications for this visit.       Social History     Socioeconomic History     Marital  status: Single     Spouse name: Not on file     Number of children: Not on file     Years of education: Not on file     Highest education level: Not on file   Occupational History     Not on file   Tobacco Use     Smoking status: Never Smoker     Smokeless tobacco: Never Used   Substance and Sexual Activity     Alcohol use: No     Drug use: No     Sexual activity: Never   Other Topics Concern     Not on file   Social History Narrative    She is a sophomore at the [employer] studying biology/premed.  Doing well in school.  She also has a Yakut minor and plans to study abroad in [place] for Spring 2019 semester.   Non-smoker, no alcohol use.  Not sexually active.    Joyce ortez MD       Social Determinants of Health     Financial Resource Strain:      Difficulty of Paying Living Expenses:    Food Insecurity:      Worried About Running Out of Food in the Last Year:      Ran Out of Food in the Last Year:    Transportation Needs:      Lack of Transportation (Medical):      Lack of Transportation (Non-Medical):    Physical Activity:      Days of Exercise per Week:      Minutes of Exercise per Session:    Stress:      Feeling of Stress :    Social Connections:      Frequency of Communication with Friends and Family:      Frequency of Social Gatherings with Friends and Family:      Attends Denominational Services:      Active Member of Clubs or Organizations:      Attends Club or Organization Meetings:      Marital Status:    Intimate Partner Violence:      Fear of Current or Ex-Partner:      Emotionally Abused:      Physically Abused:      Sexually Abused:        No past medical history on file.    Family History   Problem Relation Age of Onset     Thyroid Disease Mother         hyper--> Iodine--> now hypo     Fibroids Mother         caused anemia     Anemia Father        No past surgical history on file.    Objective:     /66 (BP Location: Left arm, Patient Position: Sitting, Cuff Size: Adult Regular)   " Pulse 70   Ht 1.816 m (5' 11.5\")   Wt 71.7 kg (158 lb)   SpO2 98%   BMI 21.73 kg/m      Patient is alert, no obvious distress.   Skin: Warm, dry.  No rashes or lesions. Skin turgor rapid return.   HEENT:  Eyes normal.  Ears normal.  Nose patent, mucosa pink.  Oropharynx mucosa pink, no lesions or tonsil enlargement.   Neck:  Supple, without lymphadenopathy, bruits, JVD. Thyroid normal texture and size.    Lungs:  Clear to auscultation.  No wheezing, rales noted.  Respirations even and unlabored.   Heart:  Regular rate and rhythm.  No murmurs.   Breasts:  Normal.  No surrounding adenopathy.   Abdomen: Soft, nontender.  No organomegaly.  Bowel sounds normoactive.  No guarding or masses noted.   :  deferred  Musculoskeletal:  Full ROM of extremities.  Muscle strength equal +5/5.   Neurological:  Cranial nerves 2-12 intact.               "

## 2021-10-10 ENCOUNTER — HEALTH MAINTENANCE LETTER (OUTPATIENT)
Age: 21
End: 2021-10-10

## 2021-12-22 ENCOUNTER — TRANSFERRED RECORDS (OUTPATIENT)
Dept: HEALTH INFORMATION MANAGEMENT | Facility: CLINIC | Age: 21
End: 2021-12-22
Payer: COMMERCIAL

## 2022-02-15 NOTE — NURSING NOTE
Comprehensive Intake Entered On:  10/15/2020 8:31 AM CDT    Performed On:  10/15/2020 8:28 AM CDT by Jacque Fulton               Summary   Chief Complaint :   Exposed to COVID.  Room mate tested positive on Tuesday.  Patient tested negative yesterday, but doen't think the test is reliable.  Verbal consent for video visit given.    Jacque Fulton - 10/15/2020 8:28 AM CDT   Health Status   Allergies Verified? :   Yes   Medication History Verified? :   Yes   Tobacco Use? :   Never smoker   Jacque Fulton - 10/15/2020 8:28 AM CDT   Meds / Allergies   (As Of: 10/15/2020 8:31:26 AM CDT)   Allergies (Active)   No Known Medication Allergies  Estimated Onset Date:   Unspecified ; Created By:   Jacque Fulton; Reaction Status:   Active ; Category:   Drug ; Substance:   No Known Medication Allergies ; Type:   Allergy ; Updated By:   Jacque Fulton; Reviewed Date:   10/15/2020 8:29 AM CDT        Medication List   (As Of: 10/15/2020 8:31:26 AM CDT)        ID Risk Screen   Recent Travel History :   No recent travel   Family Member Travel History :   No recent travel   Other Exposure to Infectious Disease :   Community exposure to COVID-19 within the last 14 days   Jacque Fulton - 10/15/2020 8:28 AM CDT

## 2022-02-15 NOTE — NURSING NOTE
Pt seen at [facility] today for COVID testing per Dr Segovia.  O2 was95%.  Specimen sent to [place] lab.  Pt resides in [facility].  If positive,  Pt PUI form will be faxed to Public Health.  Victor M Pantoja CMA

## 2022-02-15 NOTE — TELEPHONE ENCOUNTER
---------------------  From: Elizabeth Wiseman CMA   To: Appointment Pool ([id number]);     Sent: 5/4/2021 11:03:24 AM CDT  Subject: COVID curbside testing     Please call pt to get scheduled for curbside COVID testing today per GTGScheduled for today 5.4.21 at 2:50

## 2022-02-15 NOTE — PROGRESS NOTES
Chief Complaint    Exposed to COVID.  Room mate tested positive on Tuesday.  Patient tested negative yesterday, but doen't think the test is reliable.  Verbal consent for video visit given.  History of Present Illness       Patient is a college student.  Her upstairs roommate had a test on Sunday that has come back positive for Kovic was her having some upper respiratory symptoms.  Patient had a rapid antibody test that was negative but would like to have a PCR test.       This is a video visit from her room there is a record from 848 8:55 AM  Review of Systems       Negative for any going or present symptoms  Physical Exam       Alert and talkative normal cognition  Assessment/Plan       Exposure to COVID-19 virus (Z20.828)          I reviewed the timing of symptoms from exposure talked about asymptomatic positive cases she can have testing done today she may need to repeat in 5 or 6 days         Ordered:          SARS-CoV-2 RNA (COVID-19), Qualitative NAAT* ([facility]), Specimen Type: Anterior Nares, Collection Date: 10/15/20 8:37:00 CDT           Patient Information     Name:CHAY MCKEON      Address:      [address] [place] [id number]     Sex:Female     YOB: 2000     [phone]     MRN:[id number]     FIN:[id number]     Location:[facility]     Date of Service:10/15/2020      Primary Care Physician:       NONE ,       Attending Physician:       Mansoor Fernandez MD, [phone]  Problem List/Past Medical History    Ongoing     No qualifying data    Historical     No qualifying data  Medications   No active medications  Allergies    No Known Medication Allergies  Social History    Smoking Status     Never smoker

## 2022-02-15 NOTE — TELEPHONE ENCOUNTER
---------------------  From: Priya Townsend RN   Sent: 10/18/2020 9:45:21 AM CDT  Subject: COVID Results     Called patient and left message on identified voicemail informing her of negative COVID results. Asked for call back if any questions/concerns.

## 2022-02-15 NOTE — TELEPHONE ENCOUNTER
---------------------  From: Marta LEOS, Saniya DUMONT   Sent: 10/15/2020 2:44:47 PM CDT  Subject: Curbside testing     Pt had curbside testing done at the clinic today Per  ZIM for COVID-19. 02 Sat = 92 with nail polish. Specimen sent to [facility] lab. Faxed forms to [facility].

## 2022-02-15 NOTE — NURSING NOTE
I called and left message on pt personal voicemail that COVID test done 5/4/21 was negative.  [phone]  Victor M Pantoja CMA

## 2022-02-15 NOTE — PROGRESS NOTES
Chief Complaint    Check in to see if she could get a Covid test..  Room mate as Covid since last week.   Visit type:  Video visit via [employer] or xaitment   Participants in room during visit:  patient   Location of patient:  home   Location of physician:  office   Video start time:  1054   Video end time:  1059   Today's visit was conducted by video conference due to the COVID-19 pandemic.  The patient's consent to proceed with the video conference was obtained and documented.  History of Present Illness      Patient is requesting SARS-CoV-2 testing.  Her roommate tested positive last week.      Last week had headache and nausea, now feels fine  Review of Systems          ROS reviewed and negative except for symptoms noted in HPI.  Physical Exam      Appears well, NAD  Assessment/Plan       1. Contact with or exposure to viral disease (Z20.828)             Patient is referred for [facility] COVID-19 testing and is instructed of the following:            Patient should remain isolated until results of test return and given that tests are not 100% accurate, would be safest to assume that they are contagious with COVID-19 until their symptoms have fully resolved. Isolation is recommended for at least 7 days from the onset of symptoms and for 3 days after resolution of fevers and productive cough. This means patient should not go to work or any public areas. In addition, it is recommended at home that they separate themselves from other people and from animals as much as possible, including using a separate bathroom. If they do need to be around others, a facemask is recommended. Frequent hand hygiene and cleaning of high touch surfaces is also recommended.             Symptoms can last for several weeks. For patients with COVID-19, they can sometimes start to improve and then get worse again. If symptoms worsen at any time, including significant shortness of breath, low oxygen levels, high fevers that cannot be  controlled, or concerns for dehydration, they should seek medical care. If going to the ER, calling 911, or seeking care at the clinic, they are reminded to notify staff that they have been tested for COVID-19.            Patient also is informed that testing will be done in their car at a scheduled time. Test will be sent to an outside commercial lab and billed by that lab. [email or web address] cannot confirm to patient how billing will be handled by their insurance company.              Patient is also informed that testing for COVID-19 must be reported to the public health department along with contact information for the patient.             Patient information is given to scheduling staff to get patient scheduled for testing. Patient will receive further instructions from scheduling staff.            Patient is encouraged to call back at any time with questions or concerns.         2. Nausea (R11.0)       3. Headache (R51.9)  Patient Information     Name:CHAY MCKEON      Address:      [address] [place] [phone]     Sex:Female     YOB: 2000     [phone]     MRN:[id number]     FIN:[id number]     Location:[facility]     Date of Service:05/04/2021      Primary Care Physician:       NONE ,       Attending Physician:       Caesar Wright MD, [phone]  Problem List/Past Medical History    Ongoing     No qualifying data    Historical     No qualifying data  Medications   No active medications  Allergies    No Known Medication Allergies  Social History    Smoking Status     Never smoker     Electronic Cigarette/Vaping      Electronic Cigarette Use: Never.     Tobacco      Never (less than 100 in lifetime)

## 2022-02-15 NOTE — NURSING NOTE
Comprehensive Intake Entered On:  5/4/2021 10:49 AM CDT    Performed On:  5/4/2021 10:43 AM CDT by Zaina Nicolas               Summary   Chief Complaint :   Check in to see if she could get a Covid test..  Room mate as Covid since last week.    Zaina Nicolas - 5/4/2021 10:43 AM CDT   Health Status   Allergies Verified? :   Yes   Medication History Verified? :   Yes   Medical History Verified? :   No   Pre-Visit Planning Status :   Not completed   Zaina Nicolas - 5/4/2021 10:43 AM CDT   Consents   Consent for Immunization Exchange :   Consent Granted   Consent for Immunizations to Providers :   Consent Granted   Zaina Nicolas - 5/4/2021 10:43 AM CDT   Meds / Allergies   (As Of: 5/4/2021 10:49:14 AM CDT)   Allergies (Active)   No Known Medication Allergies  Estimated Onset Date:   Unspecified ; Created By:   Jacque Fulton; Reaction Status:   Active ; Category:   Drug ; Substance:   No Known Medication Allergies ; Type:   Allergy ; Updated By:   Jacque Fulton; Reviewed Date:   10/15/2020 8:29 AM CDT        Medication List   (As Of: 5/4/2021 10:49:14 AM CDT)        ID Risk Screen   Recent Travel History :   No recent travel   Family Member Travel History :   No recent travel   Other Exposure to Infectious Disease :   Community exposure to COVID-19 within the last 14 days   COVID-19 Testing Status :   No positive COVID-19 test   Zaina Nicolas - 5/4/2021 10:43 AM CDT   Social History   Social History   (As Of: 5/4/2021 10:49:14 AM CDT)   Tobacco:        Never (less than 100 in lifetime)   (Last Updated: 5/4/2021 10:43:33 AM CDT by Zaina Nicolas)          Electronic Cigarette/Vaping:        Electronic Cigarette Use: Never.   (Last Updated: 5/4/2021 10:43:38 AM CDT by Zaina Nicolas)

## 2022-03-22 ENCOUNTER — OFFICE VISIT (OUTPATIENT)
Dept: FAMILY MEDICINE | Facility: CLINIC | Age: 22
End: 2022-03-22
Payer: COMMERCIAL

## 2022-03-22 VITALS
WEIGHT: 151.1 LBS | BODY MASS INDEX: 20.47 KG/M2 | DIASTOLIC BLOOD PRESSURE: 60 MMHG | HEIGHT: 72 IN | SYSTOLIC BLOOD PRESSURE: 120 MMHG | HEART RATE: 78 BPM

## 2022-03-22 DIAGNOSIS — Z13.220 LIPID SCREENING: ICD-10-CM

## 2022-03-22 DIAGNOSIS — Z00.00 ENCOUNTER FOR ROUTINE HISTORY AND PHYSICAL EXAM IN FEMALE: Primary | ICD-10-CM

## 2022-03-22 DIAGNOSIS — Z00.00 HEALTH CARE MAINTENANCE: ICD-10-CM

## 2022-03-22 DIAGNOSIS — Z13.1 DIABETES MELLITUS SCREENING: ICD-10-CM

## 2022-03-22 LAB
ABO/RH(D): NORMAL
CHOLEST SERPL-MCNC: 139 MG/DL
FASTING STATUS PATIENT QL REPORTED: YES
FASTING STATUS PATIENT QL REPORTED: YES
GLUCOSE BLD-MCNC: 89 MG/DL (ref 70–125)
HDLC SERPL-MCNC: 50 MG/DL
HGB BLD-MCNC: 12 G/DL (ref 11.7–15.7)
LDLC SERPL CALC-MCNC: 79 MG/DL
SPECIMEN EXPIRATION DATE: NORMAL
TRIGL SERPL-MCNC: 52 MG/DL

## 2022-03-22 PROCEDURE — 82947 ASSAY GLUCOSE BLOOD QUANT: CPT | Performed by: NURSE PRACTITIONER

## 2022-03-22 PROCEDURE — 86901 BLOOD TYPING SEROLOGIC RH(D): CPT | Performed by: NURSE PRACTITIONER

## 2022-03-22 PROCEDURE — 80061 LIPID PANEL: CPT | Performed by: NURSE PRACTITIONER

## 2022-03-22 PROCEDURE — 36415 COLL VENOUS BLD VENIPUNCTURE: CPT | Performed by: NURSE PRACTITIONER

## 2022-03-22 PROCEDURE — 86900 BLOOD TYPING SEROLOGIC ABO: CPT | Performed by: NURSE PRACTITIONER

## 2022-03-22 PROCEDURE — 85018 HEMOGLOBIN: CPT | Performed by: NURSE PRACTITIONER

## 2022-03-22 PROCEDURE — 99395 PREV VISIT EST AGE 18-39: CPT | Performed by: NURSE PRACTITIONER

## 2022-03-22 NOTE — PROGRESS NOTES
Assessment and Plan:    Encounter for routine history and physical exam in female  Discussed consuming a healthy diet and exercising.  She declines HIV and hepatitis C screening.  She has never been sexually active and refuses a Pap smear today.  She declines Gardasil vaccine.  She did not receive an influenza vaccine this year.  - Hemoglobin    Diabetes mellitus screening  - Glucose    Lipid screening  - Lipid panel reflex to direct LDL Fasting    Health care maintenance  Patient requests blood typing today.   - ABO and Rh    I enjoyed my visit with Priya today and look forward to seeing her as needed in the future.    Subjective:     Priya is a 21 year old female presenting to the clinic for a female physical.     LMP: 3/8/22 regular once/month   Hx of abnormal pap smear: n/a  Last pap smear: n/a  Perform self-breast exams: yes   Vaginal discharge or irritation: none   Sexually active: no engaged to be  5/7/22   Contraception: none   Concerns for STDs: none   Previous pregnancies:none     Answers for HPI/ROS submitted by the patient on 3/22/2022  Frequency of exercise:: None  Getting at least 3 servings of Calcium per day:: NO  Diet:: Regular (no restrictions)  Taking medications regularly:: Yes  Medication side effects:: None  Bi-annual eye exam:: Yes  Dental care twice a year:: NO  Sleep apnea or symptoms of sleep apnea:: None  Additional concerns today:: Yes    Patient requests blood typing today.  She feels well today, otherwise, and has no other concerns.    Review of systems:  I performed a 10 point review of systems.  All pertinent positives and negatives are noted in the HPI. All others are negative.     No Known Allergies    No current outpatient medications on file.     No current facility-administered medications for this visit.       Social History     Socioeconomic History     Marital status: Single     Spouse name: Not on file     Number of children: Not on file     Years of education: Not on  file     Highest education level: Not on file   Occupational History     Not on file   Tobacco Use     Smoking status: Never Smoker     Smokeless tobacco: Never Used   Vaping Use     Vaping Use: Never used   Substance and Sexual Activity     Alcohol use: Yes     Alcohol/week: 1.0 standard drink     Types: 1 Glasses of wine per week     Drug use: No     Sexual activity: Never   Other Topics Concern     Not on file   Social History Narrative    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  Doing well in school.  She also has a Malay minor and plans to study abroad in [place] for Spring 2019 semester.   Non-smoker, no alcohol use.  Not sexually active.    Joyce ortez MD       Social Determinants of Health     Financial Resource Strain: Not on file   Food Insecurity: Not on file   Transportation Needs: Not on file   Physical Activity: Not on file   Stress: Not on file   Social Connections: Not on file   Intimate Partner Violence: Not on file   Housing Stability: Not on file       No past medical history on file.    Family History   Problem Relation Age of Onset     Thyroid Disease Mother         hyper--> Iodine--> now hypo     Fibroids Mother         caused anemia     Anemia Father      Diabetes Maternal Grandmother      Macular Degeneration Maternal Grandfather      Heart Disease Paternal Grandfather        Past Surgical History:   Procedure Laterality Date     WISDOM TOOTH EXTRACTION         Objective:     /60 (BP Location: Right arm, Patient Position: Sitting, Cuff Size: Adult Regular)   Pulse 78   Ht 1.829 m (6')   Wt 68.5 kg (151 lb 1.6 oz)   BMI 20.49 kg/m      Patient is alert, no obvious distress.   Skin: Warm, dry.  No rashes or lesions. Skin turgor rapid return.   HEENT:  Eyes normal.  Ears normal.  Nose patent, mucosa pink.  Oropharynx mucosa pink, no lesions or tonsil enlargement.   Neck:  Supple, without lymphadenopathy, bruits, JVD. Thyroid normal texture and  size.    Lungs:  Clear to auscultation.  No wheezing, rales noted.  Respirations even and unlabored.   Heart:  Regular rate and rhythm.  No murmurs.   Breasts:  Normal.  No surrounding adenopathy.   Abdomen: Soft, nontender.  No organomegaly.  Bowel sounds normoactive.  No guarding or masses noted.   :  deferred  Musculoskeletal:  Full ROM of extremities.  Muscle strength equal +5/5.   Neurological:  Cranial nerves 2-12 intact.

## 2022-09-17 ENCOUNTER — HEALTH MAINTENANCE LETTER (OUTPATIENT)
Age: 22
End: 2022-09-17

## 2023-05-06 ENCOUNTER — HEALTH MAINTENANCE LETTER (OUTPATIENT)
Age: 23
End: 2023-05-06

## 2024-07-14 ENCOUNTER — HEALTH MAINTENANCE LETTER (OUTPATIENT)
Age: 24
End: 2024-07-14